# Patient Record
Sex: MALE | Race: BLACK OR AFRICAN AMERICAN
[De-identification: names, ages, dates, MRNs, and addresses within clinical notes are randomized per-mention and may not be internally consistent; named-entity substitution may affect disease eponyms.]

---

## 2017-02-02 ENCOUNTER — HOSPITAL ENCOUNTER (EMERGENCY)
Dept: HOSPITAL 10 - E/R | Age: 42
LOS: 1 days | Discharge: HOME | End: 2017-02-03
Payer: COMMERCIAL

## 2017-02-02 VITALS
WEIGHT: 315 LBS | HEIGHT: 72 IN | HEIGHT: 72 IN | BODY MASS INDEX: 42.66 KG/M2 | WEIGHT: 315 LBS | BODY MASS INDEX: 42.66 KG/M2

## 2017-02-02 DIAGNOSIS — R07.9: Primary | ICD-10-CM

## 2017-02-02 DIAGNOSIS — R40.2362: ICD-10-CM

## 2017-02-02 DIAGNOSIS — R40.2142: ICD-10-CM

## 2017-02-02 DIAGNOSIS — R40.2252: ICD-10-CM

## 2017-02-02 PROCEDURE — 85730 THROMBOPLASTIN TIME PARTIAL: CPT

## 2017-02-02 PROCEDURE — 80307 DRUG TEST PRSMV CHEM ANLYZR: CPT

## 2017-02-02 PROCEDURE — 96374 THER/PROPH/DIAG INJ IV PUSH: CPT

## 2017-02-02 PROCEDURE — 85610 PROTHROMBIN TIME: CPT

## 2017-02-02 PROCEDURE — 80306 DRUG TEST PRSMV INSTRMNT: CPT

## 2017-02-02 PROCEDURE — 85025 COMPLETE CBC W/AUTO DIFF WBC: CPT

## 2017-02-02 PROCEDURE — 71010: CPT

## 2017-02-02 PROCEDURE — 84484 ASSAY OF TROPONIN QUANT: CPT

## 2017-02-02 PROCEDURE — 80053 COMPREHEN METABOLIC PANEL: CPT

## 2017-02-02 PROCEDURE — 93005 ELECTROCARDIOGRAM TRACING: CPT

## 2017-02-02 PROCEDURE — 36415 COLL VENOUS BLD VENIPUNCTURE: CPT

## 2017-02-02 PROCEDURE — 85378 FIBRIN DEGRADE SEMIQUANT: CPT

## 2017-02-02 PROCEDURE — 83690 ASSAY OF LIPASE: CPT

## 2017-02-03 VITALS — SYSTOLIC BLOOD PRESSURE: 124 MMHG | HEART RATE: 85 BPM | RESPIRATION RATE: 18 BRPM | DIASTOLIC BLOOD PRESSURE: 63 MMHG

## 2017-02-03 LAB
ALBUMIN SERPL-MCNC: 4.3 G/DL (ref 3.3–4.9)
ALBUMIN/GLOB SERPL: 1.3 {RATIO}
ALP SERPL-CCNC: 64 IU/L (ref 42–121)
ALT SERPL-CCNC: 83 IU/L (ref 13–69)
ANION GAP SERPL CALC-SCNC: 17 MMOL/L (ref 8–16)
APTT BLD: 25.3 SEC (ref 25–35)
AST SERPL-CCNC: 64 IU/L (ref 15–46)
BASOPHILS # BLD AUTO: 0.1 10^3/UL (ref 0–0.1)
BASOPHILS NFR BLD: 0.6 % (ref 0–2)
BILIRUB DIRECT SERPL-MCNC: 0 MG/DL (ref 0–0.2)
BILIRUB SERPL-MCNC: 0.3 MG/DL (ref 0.2–1.3)
BUN SERPL-MCNC: 12 MG/DL (ref 7–20)
CALCIUM SERPL-MCNC: 9 MG/DL (ref 8.4–10.2)
CHLORIDE SERPL-SCNC: 99 MMOL/L (ref 97–110)
CO2 SERPL-SCNC: 30 MMOL/L (ref 21–31)
CONDITION: 1
CREAT SERPL-MCNC: 1.07 MG/DL (ref 0.61–1.24)
D DIMER PPP FEU-MCNC: 265.84 NG/ML (ref ?–460)
EOSINOPHIL # BLD: 0.3 10^3/UL (ref 0–0.5)
EOSINOPHIL NFR BLD: 2.7 % (ref 0–7)
ERYTHROCYTE [DISTWIDTH] IN BLOOD BY AUTOMATED COUNT: 15.3 % (ref 11.5–14.5)
GLOBULIN SER-MCNC: 3.3 G/DL (ref 1.3–3.2)
GLUCOSE SERPL-MCNC: 82 MG/DL (ref 70–220)
HCT VFR BLD CALC: 44.8 % (ref 42–52)
HGB BLD-MCNC: 14.7 G/DL (ref 14–18)
INR PPP: 0.92
LH ANALYZER COMMENTS: 1
LYMPHOCYTES # BLD AUTO: 3.1 10^3/UL (ref 0.8–2.9)
LYMPHOCYTES NFR BLD AUTO: 33.5 % (ref 15–51)
MCH RBC QN AUTO: 27.1 PG (ref 29–33)
MCHC RBC AUTO-ENTMCNC: 32.9 G/DL (ref 32–37)
MCV RBC AUTO: 82.4 FL (ref 82–101)
MONOCYTES # BLD: 1 10^3/UL (ref 0.3–0.9)
MONOCYTES NFR BLD: 11.3 % (ref 0–11)
NEUTROPHILS # BLD: 4.8 10^3/UL (ref 1.6–7.5)
NEUTROPHILS NFR BLD AUTO: 51.9 % (ref 39–77)
NRBC # BLD MANUAL: 0 10^3/UL (ref 0–0)
NRBC BLD QL: 0 /100WBC (ref 0–0)
PLATELET # BLD: 301 10^3/UL (ref 140–440)
PMV BLD AUTO: 8.1 FL (ref 7.4–10.4)
POTASSIUM SERPL-SCNC: 4 MMOL/L (ref 3.5–5.1)
PROT SERPL-MCNC: 7.6 G/DL (ref 6.1–8.1)
PROTHROMBIN TIME: 12.4 SEC (ref 12.2–14.2)
PT RATIO: 1
RBC # BLD AUTO: 5.44 10^6/UL (ref 4.7–6.1)
SODIUM SERPL-SCNC: 142 MMOL/L (ref 135–144)
TROPONIN I SERPL-MCNC: < 0.012 NG/ML (ref 0–0.12)
WBC # BLD AUTO: 9.2 10^3/UL (ref 4.8–10.8)
WBC # BLD: 9.2 10^3/UL (ref 4.8–10.8)

## 2017-02-03 NOTE — RADRPT
PROCEDURE:   Chest. 

 

CLINICAL INDICATION:    Chest pain. 

 

TECHNIQUE:   Single frontal view of the chest was obtained. 

 

COMPARISON:   05/28/2013. 

 

FINDINGS:

The cardiac silhouette is magnified.  The aortic arch is unremarkable.  There is no focal consolidat
ion, vascular congestion or pleural effusion.  There is no pneumothorax. 

 

IMPRESSION:

No evidence for active cardiopulmonary disease.

_____________________________________________ 

.Bassem Verdugo MD, MD           Date    Time 

Electronically viewed and signed by .Bassem Verdugo MD, MD on 02/03/2017 02:07 

 

D:  02/03/2017 02:07  T:  02/03/2017 02:07

.T/

## 2017-02-03 NOTE — ERA
ER Documentation


Chief Complaint


Date/Time


DATE: 2/3/17 


TIME: 01:30


Chief Complaint


chest pain/sob x 1 day





HPI


The patient is a 41-year-old male, presenting to the ER because of left lower 

chest and left upper abdomen discomfort that began about 5:30 PM.  The pain is 5

/10, worse with walking and movement and deep breathing.  He denies similar 

symptoms previously.  He denies chest pain associated with vomiting or 

diaphoresis on exertion.  He denies fever, chills, nausea, vomiting, dysuria, 

diarrhea.  He does not smoke, drinks socially, denies any illicit drug





Past medical history: Chronic low back pain, history of right bundle branch 

block, dyslipidemia


Past surgical history: None





ROS


All systems reviewed and are negative except as per history of present illness.





Medications


Home Meds


Active Scripts


Azithromycin* (Zithromax*) 250 Mg Tablet, 250 MG PO .ZPACK AS DIRECTED, #6 TAB


   TAKE 500 MG (2 TABS) THE FIRST DAY THEN 250 MG (1 TAB) DAYS 2-5


   Prov:ANGELES CHERRY MD         16


Cetirizine Hcl* (Zyrtec*) 10 Mg Capsule, 10 MG PO DAILY, #20 TAB.CHEW


   Prov:ANGELES CHERRY MD         16


Prednisone* (Prednisone*) 20 Mg Tab, 40 MG PO DAILY for 4 Days, TAB


   Prov:ANGELES CHERRY MD         16


Reported Medications


[No Home Meds]   No Conflict Check


   10/16/11





Allergies


Allergies:  


Coded Allergies:  


     No Known Drug Allergies (Verified  Allergy, Unknown, 17)


Uncoded Allergies:  


     NO (Allergy, Unknown, 17)





PMhx/Soc


History of Surgery:  Yes (CYST IN NECK)


Anesthesia Reaction:  No


Hx Neurological Disorder:  No


Hx Respiratory Disorders:  No


Hx Cardiac Disorders:  No


Hx Psychiatric Problems:  No


Hx Miscellaneous Medical Probl:  Yes (SCIATICA)


Hx Alcohol Use:  Yes (OOC)


Hx Substance Use:  No


Hx Tobacco Use:  No


Smoking Status:  Never smoker





Physical Exam


Vitals





Vital Signs








  Date Time  Temp Pulse Resp B/P Pulse Ox O2 Delivery O2 Flow Rate FiO2


 


2/3/17 02:28    155/90    


 


2/3/17 01:20  90 16 162/105 99 Room Air  


 


17 23:46 99.8 101 20 188/97 99   








Physical Exam


 Const:      No acute distress.


 Head:        Atraumatic.


 Eyes:       Normal Conjunctiva.


 ENT:         Normal External Ears, Nose and Mouth.


 Neck:        Full range of motion.  No meningismus.


 Resp:         Clear to auscultation bilaterally.


 Cardio:       Regular rate and rhythm, no murmurs.  Mild left lower chest 

tender on palpation, no erythema, no crepitus


 Abd:         Soft,  non distended, normal bowel sounds, non tender.


 Skin:         No petechiae or rashes.


 Back:        No midline or flank tenderness.


 Ext:          No cyanosis, or edema.


 Neur:        Awake and alert. No focal deficit


 Psych:        Normal Mood and Affect.


Result Diagram:  


2/3/17 0127                                                                    

            2/3/17 0127





Results 24 hrs








 Laboratory Tests








Test


  2/3/17


01:27


 


Activated Partial


Thromboplast Time 25.3Sec 


 


 


Alanine Aminotransferase


(ALT/SGPT) 83IU/L 


 


 


Albumin 4.3g/dl 


 


Albumin/Globulin Ratio 1.30 


 


Alkaline Phosphatase 64IU/L 


 


Anion Gap 17 


 


Aspartate Amino Transf


(AST/SGOT) 64IU/L 


 


 


Basophils # 0.110^3/ul 


 


Basophils % 0.6% 


 


Blood Morphology Comment  


 


Blood Urea Nitrogen 12mg/dl 


 


Calcium Level 9.0mg/dl 


 


Carbon Dioxide Level 30mmol/L 


 


Chloride Level 99mmol/L 


 


Creatinine 1.07mg/dl 


 


D-Dimer 265.84ng/ml 


 


D-Dimer Comment  


 


Direct Bilirubin 0.00mg/dl 


 


Eosinophils # 0.310^3/ul 


 


Eosinophils % 2.7% 


 


Ethyl Alcohol Level mg/dl 


 


Globulin 3.30g/dl 


 


Glucose Level 82mg/dl 


 


Hematocrit 44.8% 


 


Hemoglobin 14.7g/dl 


 


INR International Normalized


Ratio 0.92 


 


 


Indirect Bilirubin 0.3mg/dl 


 


Lipase 77U/L 


 


Lymphocytes # 3.110^3/ul 


 


Lymphocytes % 33.5% 


 


Mean Corpuscular Hemoglobin 27.1pg 


 


Mean Corpuscular Hemoglobin


Concent 32.9g/dl 


 


 


Mean Corpuscular Volume 82.4fl 


 


Mean Platelet Volume 8.1fl 


 


Monocytes # 1.010^3/ul 


 


Monocytes % 11.3% 


 


Neutrophils # 4.810^3/ul 


 


Neutrophils % 51.9% 


 


Nucleated Red Blood Cells # 0.010^3/ul 


 


Nucleated Red Blood Cells % 0.0/100WBC 


 


Platelet Count 30077^3/UL 


 


Potassium Level 4.0mmol/L 


 


Prothrombin Time 12.4Sec 


 


Prothrombin Time Ratio 1.0 


 


Red Blood Count 5.4410^6/ul 


 


Red Cell Distribution Width 15.3% 


 


Sodium Level 142mmol/L 


 


Total Bilirubin 0.3mg/dl 


 


Total Protein 7.6g/dl 


 


Troponin I < 0.012ng/ml 


 


White Blood Count 9.210^3/ul 








 Current Medications








 Medications


  (Trade)  Dose


 Ordered  Sig/Aliya


 Route


 PRN Reason  Start Time


 Stop Time Status Last Admin


Dose Admin


 


 Ketorolac


 Tromethamine


  (Toradol)  30 mg  ONCE  STAT


 IV


   2/3/17 01:37


 2/3/17 01:40 DC 2/3/17 01:45


 














Procedures/MDM





 [place] [id number]


 Rad[facility] Line: [phone]





 DIAGNOSTIC IMAGING REPORT





 Patient: SHYANNE BEDOYA   : 1975   Age: 41  Sex: M                  

      


 MR #:    [id number]   Acct #:   [id number]    DOS: 17 0137


 Ordering MD: VIOLA NOVOA MD   Location:  E/R   Room/Bed:                  

                          


 








PROCEDURE:   Chest. 


 


CLINICAL INDICATION:    Chest pain. 


 


TECHNIQUE:   Single frontal view of the chest was obtained. 


 


COMPARISON:   2013. 


 


FINDINGS:


The cardiac silhouette is magnified.  The aortic arch is unremarkable.  There 

is no focal consolidation, vascular congestion or pleural effusion.  There is 

no pneumothorax. 


 


IMPRESSION:


No evidence for active cardiopulmonary disease.


_____________________________________________ 


.Bassem Verdugo MD, MD           Date    Time 


Electronically viewed and signed by .Bassem Verdugo MD, MD on 2017 02:07 


 


D:  2017 02:07  T:  2017 02:07


.T/





CC: VIOLA NOVOA MD





EKG: Read by emergency physician at 11:52 PM


Rate/Rhythm:             Sinus tachycardia 102 beats per min


QRS, ST, T-waves:    No ST elevation, no T wave inversion, right bundle branch 

block


Impression:              Abnormal EKG





EKG: Read by emergency physician at 1:54 AM


Rate/Rhythm:             Normal sinus rhythm 90 beats per min


QRS, ST, T-waves:    No ST elevation, no T wave inversion, right bundle branch 

block


Impression:              Abnormal EKG





MEDICAL MAKING DECISION: The patient is a 41-year-old male, presenting with 

acute chest pain of unclear etiology, most likely acute musculoskeletal pain.  

He was treated with Toradol 30 mg IV for pain with good response.  The 

differential diagnoses considered include but are not limited to acute coronary 

syndrome, acute myocardial infarction, pericarditis, pulmonary embolism, aortic 

dissection, pneumonia, pleural effusion, pneumothorax, GERD, chest wall pain.





Departure


Diagnosis:  


 Primary Impression:  


 Chest pain


Condition:  Good


Comments


He was discharged with Motrin and soma


I discussed the findings with the patient. I advised the patient to follow-up 

with the primary physician in about 1-2 days, sooner if needed and return if 

any concern.





The patient's blood pressure was elevated (>120/80) but appears stable without 

evidence of hypertension emergency or urgency.  The patient was counseled about 

the risks of hypertension and urged to pursue outpatient monitoring and therapy 

within a week with their primary care physician.











VIOLA NOVOA MD Feb 3, 2017 01:30

## 2018-06-02 ENCOUNTER — HOSPITAL ENCOUNTER (EMERGENCY)
Dept: HOSPITAL 91 - FTE | Age: 43
LOS: 1 days | Discharge: HOME | End: 2018-06-03
Payer: COMMERCIAL

## 2018-06-02 ENCOUNTER — HOSPITAL ENCOUNTER (EMERGENCY)
Age: 43
LOS: 1 days | Discharge: HOME | End: 2018-06-03

## 2018-06-02 DIAGNOSIS — M25.562: Primary | ICD-10-CM

## 2018-06-02 PROCEDURE — 96372 THER/PROPH/DIAG INJ SC/IM: CPT

## 2018-06-02 PROCEDURE — 73562 X-RAY EXAM OF KNEE 3: CPT

## 2018-06-02 PROCEDURE — 99284 EMERGENCY DEPT VISIT MOD MDM: CPT

## 2018-06-03 RX ADMIN — KETOROLAC TROMETHAMINE 1 MG: 15 INJECTION, SOLUTION INTRAMUSCULAR; INTRAVENOUS at 01:31

## 2018-09-25 ENCOUNTER — HOSPITAL ENCOUNTER (EMERGENCY)
Age: 43
Discharge: HOME | End: 2018-09-25

## 2018-09-25 ENCOUNTER — HOSPITAL ENCOUNTER (EMERGENCY)
Dept: HOSPITAL 91 - E/R | Age: 43
Discharge: HOME | End: 2018-09-25
Payer: COMMERCIAL

## 2018-09-25 DIAGNOSIS — M62.830: Primary | ICD-10-CM

## 2018-09-25 PROCEDURE — 76775 US EXAM ABDO BACK WALL LIM: CPT

## 2018-09-25 PROCEDURE — 72100 X-RAY EXAM L-S SPINE 2/3 VWS: CPT

## 2018-09-25 PROCEDURE — 96372 THER/PROPH/DIAG INJ SC/IM: CPT

## 2018-09-25 PROCEDURE — 99285 EMERGENCY DEPT VISIT HI MDM: CPT

## 2018-09-25 RX ADMIN — KETOROLAC TROMETHAMINE 1 MG: 30 INJECTION, SOLUTION INTRAMUSCULAR at 17:50

## 2018-09-25 RX ADMIN — SODIUM CHLORIDE 1 MG: 9 INJECTION, SOLUTION INTRAVENOUS at 17:51

## 2018-10-15 ENCOUNTER — HOSPITAL ENCOUNTER (EMERGENCY)
Age: 43
Discharge: HOME | End: 2018-10-15